# Patient Record
(demographics unavailable — no encounter records)

---

## 2025-01-31 NOTE — PHYSICAL EXAM
[de-identified] : General: Well-developed and well-nourished.  No acute distress. Psychiatric: Behavior was cooperative   Head:  Normocephalic and atraumatic Eyes:  Sclera white. Conjunctiva and eyelids pink and moist without discharge. Cardiovascular:  Regular Respiratory:  Trachea midline. Normal effort. No accessory muscle use with respiration Abdomen: Non distended, soft and nontender Skin:  No rashes, ulcers, or lesions appreciated. Neck: There is no pain with extension, Extremities: No edema  Musculoskeletal: Moving all extremities freely  Neuro: CN 2-12 Grossly intact Sensation is mildly impaired to light touch C6 dermatome left upper extremity Bicep strength 4+/5 bilaterally Gait: Ambulates with no assistive device.

## 2025-01-31 NOTE — HISTORY OF PRESENT ILLNESS
[FreeTextEntry1] : Interval Note:   Ongoing back  Medication - Tizanidine has been allowing patient to go about activities of daily living with less pain.  Moving bowels regularly. No recent falls.   Patient denies any bowel/bladder incontinence, no saddle/perineal anesthesia or any other red flag signs or symptoms.   ---  Right C6/7  RFA on  09/05/2024 - Improved pain from 9/10 to 1 /10 (80% relief).  reports significant improvement in pain  Right C6/7  diagnostic medial branch block on  07/18/2024 - Improved pain from 9/10 to 1 /10 (80% relief).  Reports significant improvement in pain and range of motion for six hours following the procedure.   Right C6/7  diagnostic medial branch block on 5/30/2024 - Improved pain from 9/10 to 1 /10 (80% relief).  Reports significant improvement in pain and range of motion for several hours following the procedure. The pain has since returned. Reports that they were was able to go about ADL's, clean hours, groom themselves with minimal pain at the time.    ---  HPI - Mr. Shahid Tinoco, a 28-year-old male, presents today with a 2-year history of persistent back pain, neck pain, and hand numbness. Referred by Dr Cevallos. He describes his pain as constant, with a current and average intensity of 6/10, occasionally peaking at 10/10. The pain is characterized by a diverse array of sensations, including sharp, dull, aching, burning, shooting, and electric feelings. His discomfort is exacerbated by various physical activities such as laying, sitting, standing, walking, transitioning positions, bending, lifting, and movements involving his head and neck. Conversely, his pain is somewhat alleviated by medications, heat, and rest. Despite engaging in physical therapy and chiropractic treatments, which provided minimal relief, his symptoms have persisted and progressed, particularly impacting his daily living activities and sleep quality. Notably, Mr. Tinoco denies experiencing any numbness or weakness currently, alongside any red-flag symptoms such as bowel/bladder incontinence or saddle anesthesia, maintaining regular bowel movements. He has not previously sought pain management specialist care but reports adherence to a provider-directed stretching regimen over the last 6 weeks, indicating a commitment to non-pharmacological interventions for symptom management. Seen by Neurology - EMG - WNL.

## 2025-01-31 NOTE — ASSESSMENT
[FreeTextEntry1] : Mr. 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   Tizanidine 2 mg po bid prn spasms  >> Interventions   Not amenable to TPI at this time   >> Therapy and Other Modalities   Continue with daily home stretching regimen  >> Imaging and Other Studies  I have personally reviewed the images in detail together with the patient today, and I have answered all questions regarding this condition to the best of my ability, to the patient's satisfaction.  >> Consults  Neurosurgery referral  Sports Medicine [facility]

## 2025-01-31 NOTE — DATA REVIEWED
[FreeTextEntry1] : PROCEDURE: MRI CERVICAL SPINE  ORDER #: [id number] CC: ; ; ; End of cc's  MRI of the cervical spine  History: Neck pain  Technique: Multiplanar, multi sequential images of the cervical spine were obtained without contrast using a standard protocol.  Prior study: Radiographs dated 3/29/2024  Findings:  Bone: No acute fracture. Partial osseous fusion of the C5-C6 disc space with fusion of the facets at this level. No acute fracture.  Alignment: No significant spondylolisthesis.  Disc spaces: Partial fusion of the C5-C6 disc space, as above. Disc spaces are otherwise unremarkable.  Spinal cord: No cord signal abnormality.  Paraspinal/Prevertebral soft tissues: Indeterminate nodular T2 hyperintensity within the left upper lung, measuring 6 mm.  Evaluation of the individual motion segments demonstrates the following:  C2/3 level: No canal or neuroforaminal stenosis.  C3/4 level: No canal or neuroforaminal stenosis.  C4/5 level: Mild bilateral uncovertebral joint hypertrophy. Mild bilateral neural foraminal narrowing, right greater than left. No central canal stenosis.  C5/6 level: Partial fusion of the disc space. Fusion of the facet joints bilaterally. No canal or neuroforaminal stenosis.  C6/7 level: Mild left uncovertebral joint hypertrophy. Minimal left and no right neural foraminal narrowing. No central canal stenosis.  C7/T1 level: No canal or neuroforaminal stenosis.  Impression:  Partial osseous fusion of the C5-C6 disc space with fusion of the facet joints bilaterally at this level.  Mild degenerative changes at C4-C5 and C6-C7, as detailed above.  Indeterminate nodular T2 hyperintensity within the left upper lung, measuring 6 mm, which is not well evaluated on this MRI. Recommend further evaluation with low dose chest CT.  COMMUNICATION: Findings were sent via [employer] email to Dr. Tinoco at the time of this dictation.  --- End of Report ---  Electronically Signed: ____________________________________________ Tray Webb MD 05/08/24 1006

## 2025-02-04 NOTE — ASSESSMENT
[FreeTextEntry1] : Chronic neck pain with history of Klippel-Feil syndrome, autofusion of C5-C6, cervical radiculopathy which has largely failed pain management, physical therapy, medication therapy.  Most recent MRI from May 2024 reviewed which she has had worsening symptoms since.  Reported previous nerve conduction studies completed 2 years ago were normal prior to worsening symptoms. - Will obtain updated MRI cervical spine as symptoms have significantly worsened with right arm weakness since his previous - Order placed for updated NCS/EMG studies - Naproxen twice daily as needed.  Discussed caution with NSAIDs - Tizanidine as already prescribed as needed - Continue home exercises that he learned from physical therapy - Will plan to follow-up after imaging and nerve studies.  Would likely benefit from surgical evaluation given failure to conservative care

## 2025-02-04 NOTE — HISTORY OF PRESENT ILLNESS
[de-identified] : 02/04/2025: Patient is a 29-year-old male presenting for evaluation of neck pain and right-sided shoulder pain.  He has had persistent neck pain for approximately 3 years now without injury.  He notes pain at rest that worsens with any movement or physical activity.  Prior to pain, he states he was very active with exercise which she is no longer able to do.  He feels pain travel from the posterior aspect of the neck into both shoulders.  He notices weakness and heaviness of his arms.  He has tried NSAIDs, several rounds (4) of physical therapy without relief, medial branch block/RFA at C6-7 without long-term benefit.  He had reported EMG completed 2 years ago which was normal.  He has been seen by neurosurgery as well that recommended continued care with pain management.  He has become very frustrated with his symptoms as they interfering with even the basic activities of daily life

## 2025-02-04 NOTE — REVIEW OF SYSTEMS
[Arthralgia] : arthralgia [Joint Pain] : joint pain [Joint Stiffness] : joint stiffness [Negative] : Integumentary [Joint Swelling] : no joint swelling

## 2025-02-04 NOTE — PHYSICAL EXAM
[de-identified] : Constitutional: Well developed, well nourished, able to communicate Neuro: Normal sensation, No focal deficits Skin: Intact CV: Peripheral vascular exam grossly normal Pulm: No signs of respiratory distress Psych: Oriented, normal mood and affect  Neck: - No obvious deformity, swelling, or bruising - Significant pain to light touch right greater than left trap and cervical paraspinals - No pain with palpation of spinous processes - ROM limited throughout flexion, extension, side bending, and rotation with pain - 4/5 strength right biceps, 4+/5 strength left biceps.  Remaining upper extremities 5/5 bilaterally -Pain with Spurling Maneuver -Able to bring both shoulders into full forward flexion to 180 degrees and able to reach behind the back internally rotating to approximately lumbar spine all with pain - Distally neurovascularly intact
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
## 2025-02-07 NOTE — REVIEW OF SYSTEMS
[TextEntry] :  Head: No Alopecia, no scalp pain, no temporal headaches, no hearing loss, no red/painful eyes, no dry eyes ,no dry mouth, no painless oral ulcers,no poor dentition,no jaw claudication ENT: No enlarged lymph nodes, no parotid swelling,no dysphagia CVS: No Positional chest pain Pulm: No SOB, no cough, no hemoptysis, no pleuritic chest pain Abdomen:  No constipation,no diarrhea, no postprandial pain Skin: No rash,no dry skin,no tight skin,no nodules, no Raynaud's :  no blood in urine Heme: No clots,no hx of low WBC,no hx of low Hb,no hx of low platelets Neuro: No tingling,no numbness,no foot drop, no weakness, no seizures, no temporal headaches Systemic: No fevers, no weight loss, no night sweats No H/o radiation therapy, no recent hospitalization

## 2025-02-07 NOTE — PHYSICAL EXAM
[TextEntry] : General: alert, well appearing, no distress HEENT: no hair thinning or alopecia, clear conjunctiva, no oral or nasal ulcers, no cervical lymphadenopathy Cardiac: S1+, S2+,normal rate and rhythm, no murmur, rubs or gallops Pulm: normal respiratory effort, clear to auscultation bilaterally GI: abdomen soft, non-tender and non-distended   MSK: Hand-DIP joints, PIP joints, MCP joints, CMC joints without evidence of synovitis or effusion. Intact and nonpainful range of motion. No Heberden/Jim nodes. Wrist, elbow, and shoulder joints without evidence of synovitis or effusion. Intact and nonpainful range of motion. Foot/ankle/knee/hip joints without erythema/effusion/tenderness to palpation and with intact nonpainful range of motion. DEBBY negative bilaterally Tenderness over right Achilles, no swelling Spine: Limited range of motion in all planes secondary to pain. bilateral SI joint tenderness but has paraspinal tenderness throughout   Skin: No rashes, warm and well-perfused. No nail pitting, digital ulceration, dactylitis, or telangiectasias. No subcutaneous nodules.

## 2025-03-14 NOTE — PHYSICAL EXAM
[de-identified] : General: Well-developed and well-nourished.  No acute distress. Psychiatric: Behavior was cooperative   Head:  Normocephalic and atraumatic Eyes:  Sclera white. Conjunctiva and eyelids pink and moist without discharge. Cardiovascular:  Regular Respiratory:  Trachea midline. Normal effort. No accessory muscle use with respiration Abdomen: Non distended, soft and nontender Skin:  No rashes, ulcers, or lesions appreciated. Neck: There is no pain with extension, Extremities: No edema  Musculoskeletal: Moving all extremities freely  Neuro: CN 2-12 Grossly intact Sensation is mildly impaired to light touch C6 dermatome left upper extremity Bicep strength 4+/5 bilaterally Gait: Ambulates with no assistive device.

## 2025-03-14 NOTE — HISTORY OF PRESENT ILLNESS
[FreeTextEntry1] : Interval Note:  Ongoing neck pain - has EMG set for 3/20  Has updated MRI pending  Medication - Tizanidine has been allowing patient to go about activities of daily living with less pain.  Moving bowels regularly. No recent falls.   Patient denies any bowel/bladder incontinence, no saddle/perineal anesthesia or any other red flag signs or symptoms.   ---  Right C6/7  RFA on  09/05/2024 - Improved pain from 9/10 to 1 /10 (80% relief).  reports significant improvement in pain  Right C6/7  diagnostic medial branch block on  07/18/2024 - Improved pain from 9/10 to 1 /10 (80% relief).  Reports significant improvement in pain and range of motion for six hours following the procedure.   Right C6/7  diagnostic medial branch block on 5/30/2024 - Improved pain from 9/10 to 1 /10 (80% relief).  Reports significant improvement in pain and range of motion for several hours following the procedure. The pain has since returned. Reports that they were was able to go about ADL's, clean hours, groom themselves with minimal pain at the time.    ---  HPI - Mr. Shahid Tinoco, a 28-year-old male, presents today with a 2-year history of persistent back pain, neck pain, and hand numbness. Referred by Dr Cevallos. He describes his pain as constant, with a current and average intensity of 6/10, occasionally peaking at 10/10. The pain is characterized by a diverse array of sensations, including sharp, dull, aching, burning, shooting, and electric feelings. His discomfort is exacerbated by various physical activities such as laying, sitting, standing, walking, transitioning positions, bending, lifting, and movements involving his head and neck. Conversely, his pain is somewhat alleviated by medications, heat, and rest. Despite engaging in physical therapy and chiropractic treatments, which provided minimal relief, his symptoms have persisted and progressed, particularly impacting his daily living activities and sleep quality. Notably, Mr. Tinoco denies experiencing any numbness or weakness currently, alongside any red-flag symptoms such as bowel/bladder incontinence or saddle anesthesia, maintaining regular bowel movements. He has not previously sought pain management specialist care but reports adherence to a provider-directed stretching regimen over the last 6 weeks, indicating a commitment to non-pharmacological interventions for symptom management. Seen by Neurology - EMG - WNL.

## 2025-03-14 NOTE — DATA REVIEWED
[FreeTextEntry1] : Exam: XC C SPINE 2 OR 3 VIEWS Order#: [id number]    CLINICAL HISTORY: Rule out Ankylosing spondylitis  Thoracic Spine  Frontal and lateral projections of thoracic spine demonstrate satisfactory alignment of the bony structures. There is a very mild S-shaped thoracolumbar scoliosis. There is mild thoracic kyphosis. No bony ankylosis is noted. No fractures or vertebral compressions can be appreciated. There is no evidence of pedicle destruction.   IMPRESSION: No evidence of bony ankylosis.    Lumbosacral Spine.  Frontal, lateral and cone-down projections demonstrate satisfactory alignment of the bony structures. There is a mild thoracolumbar scoliosis centered at approximately the T12-L1 level convexity to the patient's left. No fracture or vertebral compression can be appreciated. There is no evidence of pedicle destruction. Intervertebral disc spaces are satisfactorily maintained. The sacroiliac joints are symmetric.  IMPRESSION: Mild thoracolumbar scoliosis. No evidence of bony ankylosis.    Cervical Spine.  Frontal, lateral, right and left oblique and open mouth projections demonstrate satisfactory alignment of the bony structures. There is fusion of the C5 and C6 vertebral bodies. No fracture or vertebral compression can be appreciated. No prevertebral soft tissue swelling or abnormal splaying of the spinous processes is noted. The intervertebral disc spaces and the exiting neural foramen are well maintained. No destructive bony lesion is noted.  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    Sacroiliac joints  Frontal and tangential projections demonstrate the sacroiliac joints to be symmetric and well maintained. No erosion or destructive lesion is noted. No bony sclerosis can be appreciated.  IMPRESSION: Unremarkable plain film examination of the sacroiliac joints.  --- End of Report ---    ***Electronically Signed *** ----------------------------------------------- Cornelius Tejeda MD 02/07/25 1540  Dictated on 02/07/25   Report cc: Rebecca James   Exam: XC THORACIC SPINE 2 VWS (C) Order#: [id number]    CLINICAL HISTORY: Rule out Ankylosing spondylitis  Thoracic Spine  Frontal and lateral projections of thoracic spine demonstrate satisfactory alignment of the bony structures. There is a very mild S-shaped thoracolumbar scoliosis. There is mild thoracic kyphosis. No bony ankylosis is noted. No fractures or vertebral compressions can be appreciated. There is no evidence of pedicle destruction.   IMPRESSION: No evidence of bony ankylosis.    Lumbosacral Spine.  Frontal, lateral and cone-down projections demonstrate satisfactory alignment of the bony structures. There is a mild thoracolumbar scoliosis centered at approximately the T12-L1 level convexity to the patient's left. No fracture or vertebral compression can be appreciated. There is no evidence of pedicle destruction. Intervertebral disc spaces are satisfactorily maintained. The sacroiliac joints are symmetric.  IMPRESSION: Mild thoracolumbar scoliosis. No evidence of bony ankylosis.    Cervical Spine.  Frontal, lateral, right and left oblique and open mouth projections demonstrate satisfactory alignment of the bony structures. There is fusion of the C5 and C6 vertebral bodies. No fracture or vertebral compression can be appreciated. No prevertebral soft tissue swelling or abnormal splaying of the spinous processes is noted. The intervertebral disc spaces and the exiting neural foramen are well maintained. No destructive bony lesion is noted.  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    Sacroiliac joints  Frontal and tangential projections demonstrate the sacroiliac joints to be symmetric and well maintained. No erosion or destructive lesion is noted. No bony sclerosis can be appreciated.  IMPRESSION: Unremarkable plain film examination of the sacroiliac joints.  --- End of Report ---    ***Electronically Signed *** ----------------------------------------------- Cornelius Tejeda MD 02/07/25 1540  Dictated on 02/07/25   Report cc: Rebecca James MD; Notes  Exam: XC L S SPINE 2 OR 3 VIEWS Order#: [id number]    CLINICAL HISTORY: Rule out Ankylosing spondylitis  Thoracic Spine  Frontal and lateral projections of thoracic spine demonstrate satisfactory alignment of the bony structures. There is a very mild S-shaped thoracolumbar scoliosis. There is mild thoracic kyphosis. No bony ankylosis is noted. No fractures or vertebral compressions can be appreciated. There is no evidence of pedicle destruction.   IMPRESSION: No evidence of bony ankylosis.    Lumbosacral Spine.  Frontal, lateral and cone-down projections demonstrate satisfactory alignment of the bony structures. There is a mild thoracolumbar scoliosis centered at approximately the T12-L1 level convexity to the patient's left. No fracture or vertebral compression can be appreciated. There is no evidence of pedicle destruction. Intervertebral disc spaces are satisfactorily maintained. The sacroiliac joints are symmetric.  IMPRESSION: Mild thoracolumbar scoliosis. No evidence of bony ankylosis.    Cervical Spine.  Frontal, lateral, right and left oblique and open mouth projections demonstrate satisfactory alignment of the bony structures. There is fusion of the C5 and C6 vertebral bodies. No fracture or vertebral compression can be appreciated. No prevertebral soft tissue swelling or abnormal splaying of the spinous processes is noted. The intervertebral disc spaces and the exiting neural foramen are well maintained. No destructive bony lesion is noted.  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    Sacroiliac joints  Frontal and tangential projections demonstrate the sacroiliac joints to be symmetric and well maintained. No erosion or destructive lesion is noted. No bony sclerosis can be appreciated.  IMPRESSION: Unremarkable plain film examination of the sacroiliac joints.  --- End of Report ---    ***Electronically Signed *** ----------------------------------------------- Cornelius Tejeda MD 02/07/25 1540  Dictated on 02/07/25   Report cc: Rebecca James MD;  Exam: XC SACROILIAC JOINTS Order#: [id number]    CLINICAL HISTORY: Rule out Ankylosing spondylitis  Thoracic Spine  Frontal and lateral projections of thoracic spine demonstrate satisfactory alignment of the bony structures. There is a very mild S-shaped thoracolumbar scoliosis. There is mild thoracic kyphosis. No bony ankylosis is noted. No fractures or vertebral compressions can be appreciated. There is no evidence of pedicle destruction.   IMPRESSION: No evidence of bony ankylosis.    Lumbosacral Spine.  Frontal, lateral and cone-down projections demonstrate satisfactory alignment of the bony structures. There is a mild thoracolumbar scoliosis centered at approximately the T12-L1 level convexity to the patient's left. No fracture or vertebral compression can be appreciated. There is no evidence of pedicle destruction. Intervertebral disc spaces are satisfactorily maintained. The sacroiliac joints are symmetric.  IMPRESSION: Mild thoracolumbar scoliosis. No evidence of bony ankylosis.    Cervical Spine.  Frontal, lateral, right and left oblique and open mouth projections demonstrate satisfactory alignment of the bony structures. There is fusion of the C5 and C6 vertebral bodies. No fracture or vertebral compression can be appreciated. No prevertebral soft tissue swelling or abnormal splaying of the spinous processes is noted. The intervertebral disc spaces and the exiting neural foramen are well maintained. No destructive bony lesion is noted.  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    Sacroiliac joints  Frontal and tangential projections demonstrate the sacroiliac joints to be symmetric and well maintained. No erosion or destructive lesion is noted. No bony sclerosis can be appreciated.  IMPRESSION: Unremarkable plain film examination of the sacroiliac joints.  --- End of Report ---    ***Electronically Signed *** ----------------------------------------------- Cornelius Tejeda MD 02/07/25 1540  Dictated on 02/07/25   Report cc: Rebecca James MD; PROCEDURE: MRI CERVICAL SPINE  ORDER #: [id number] CC: ; ; ; End of cc's  MRI of the cervical spine  History: Neck pain  Technique: Multiplanar, multi sequential images of the cervical spine were obtained without contrast using a standard protoco  l.  Prior study: Radiographs dated 3/29/2024  Findings:  Bone: No acute fracture. Partial osseous fusion of the C5-C6 disc space with fusion of the facets at this level. No acute fracture.  Alignment: No significant spondylolisthesis.  Disc spaces: Partial fusion of the C5-C6 disc space, as above. Disc spaces are otherwise unremarkable.  Spinal cord: No cord signal abnormality.  Paraspinal/Prevertebral soft tissues: Indeterminate nodular T2 hyperintensity within the left upper lung, measuring 6 mm.  Evaluation of the individual motion segments demonstrates the following:  C2/3 level: No canal or neuroforaminal stenosis.  C3/4 level: No canal or neuroforaminal stenosis.  C4/5 level: Mild bilateral uncovertebral joint hypertrophy. Mild bilateral neural foraminal narrowing, right greater than left. No central canal stenosis.  C5/6 level: Partial fusion of the disc space. Fusion of the facet joints bilaterally. No canal or neuroforaminal stenosis.  C6/7 level: Mild left uncovertebral joint hypertrophy. Minimal left and no right neural foraminal narrowing. No central canal stenosis.  C7/T1 level: No canal or neuroforaminal stenosis.  Impression:  Partial osseous fusion of the C5-C6 disc space with fusion of the facet joints bilaterally at this level.  Mild degenerative changes at C4-C5 and C6-C7, as detailed above.  Indeterminate nodular T2 hyperintensity within the left upper lung, measuring 6 mm, which is not well evaluated on this MRI. Recommend further evaluation with low dose chest CT.  COMMUNICATION: Findings were sent via [employer] email to Dr. Tinoco at the time of this dictation.  --- End of Report ---  Electronically Signed: ____________________________________________ Tray Webb MD 05/08/24 1008

## 2025-03-14 NOTE — ASSESSMENT
[FreeTextEntry1] : Mr. 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   Tizanidine 2 mg po bid prn spasms  Trial Gabapentin  Trial Meloxicam  Not amenable to Nortriptyline at this time  >> Interventions   Patient is an appropriate candidate for Right C6/7  radiofrequency ablation . Procedure discussed with patient including success rate, side effects and complications.   >> Therapy and Other Modalities   Continue with daily home stretching regimen  >> Imaging and Other Studies  I have personally reviewed the images in detail together with the patient today, and I have answered all questions regarding this condition to the best of my ability, to the patient's satisfaction.  >> Consults  Neurosurgery referral  Dr Mat Hall - Pain/ Psychiatry [facility]

## 2025-03-27 NOTE — REVIEW OF SYSTEMS
[Negative] : Psychiatric [FreeTextEntry9] : exam limited by cervical pain [de-identified] : pt denies sx on psych ROS

## 2025-03-27 NOTE — REASON FOR VISIT
[[facility] Provider/Facility] : [facility] Provider[facility]ility [Patient] : Patient [Prior Medical Records] : Prior Medical Records [FreeTextEntry1] : establish care

## 2025-03-27 NOTE — PHYSICAL EXAM
[Cooperative] : cooperative [Euthymic] : euthymic [Constricted] : constricted [Clear] : clear [Soft] : soft [Linear/Goal Directed] : linear/goal directed [Average] : average [WNL] : within normal limits

## 2025-03-27 NOTE — PLAN
[FreeTextEntry4] : -No acute psychiatric intervention -C/w pain mgmt per Dr. Tinoco - will coordinate -Virtual F/U TBD

## 2025-03-27 NOTE — PSYCHOSOCIAL ASSESSMENT
[All substances negative except as specified below] : All substances negative except as specified below [FreeTextEntry1] : active smoker, 1-2pp/wk, active cannabis use ~1 bowl per night  hx arrest for driving while smoking cannabis

## 2025-03-27 NOTE — HISTORY OF PRESENT ILLNESS
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  Pt seen in person. Denies prior psychiatric hx including prior diagnoses, inpatient or outpatient treatment including medication, and hx overt mood sx/psychosis/ESTEBAN/I/P. Pt does report recent coping difficulties related to worsening pain and its subsequent impact on his life and functionality. Pt states prior to emergence of pain ~3 years ago, he was in his usual state of health: he was working fulltime, he was an athlete playing multiple sports and going to the gym 3-5x/wk, and he had a busy social life. After his diagnosis, he thought he would be able to find a solution for the pain. However, pt has undergone numerous evaluations/tests as well as physical therapy, but continues to experience pain which has worsened over the last few months. Pt states he works much less now, is less able to support his mom financially, and is more limited in terms of physical activity including leaving the house. Reports feeling like he is damaging himself and/or making his condition worse if he continues to work. States he feels he does not have a real answer for why his pain persists, and that he has been 'told' that he 'should not be having the pain [he] does'. Pt admits that this has impacted his mood somewhat, but he is trying to adjust, including by pivoting to a new career and following his doctors' recommendations. Pt was recently prescribed gabapentin 100mg po nightly which he says helps w/ sleep. He reports daily cannabis use which helps w/ pain/sleep. Pt is an active smoker, reports fluctuating use ~1-2pp week. [FreeTextEntry2] : per hpi [FreeTextEntry3] : none

## 2025-03-27 NOTE — RISK ASSESSMENT
[Clinical Interview] : Clinical Interview [No] : No [Alcohol/Substance Use disorders] : alcohol/substance use disorders [Triggering events leading to humiliation, shame, and/or despair] : triggering events leading to humiliation, shame, and/or despair (e.g. loss of relationship, financial or health status) (real or anticipated) [Chronic pain/other acute medical condition] : chronic pain or other acute medical condition [Perceived burden on family or others] : perceived burden on family or others [Identifies reasons for living] : identifies reasons for living [Supportive social network of family or friends] : supportive social network of family or friends [Responsibility to children, family, or others] : responsibility to children, family, or others [Engaged in work or school] : engaged in work or school [None in the patient's lifetime] : None in the patient's lifetime [None Known] : none known [Substance abuse] : substance abuse [Residential stability] : residential stability [Relationship stability] : relationship stability [Employment stability] : employment stability

## 2025-03-27 NOTE — REASON FOR VISIT
[[facility] Provider/Facility] : [facility] Provider/Facility [Patient] : Patient [Prior Medical Records] : Prior Medical Records [FreeTextEntry1] : establish care

## 2025-03-27 NOTE — DISCUSSION/SUMMARY
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  Pt denies overt mood sx/psychosis/ESTEBAN/I/P but does report recent coping difficulties related to worsening pain over the last few months and its subsequent impact on his life and functionality. States he works much less now and is more limited in terms of physical activity including leaving the house. Reports feeling like he is damaging himself and/or making his condition worse if he continues to work. States he feels he does not have a real answer for why his pain persists, and that he has been 'told' that he 'should not be having the pain [he] does'. Suspect combination of central sensitization and adjustment reaction. Pt not interested in pharmacologic mgmt or therapy at this time. Will coordinate w/ pt's pain specialist and continue longitudinal evaluation.

## 2025-03-27 NOTE — SOCIAL HISTORY
[FreeTextEntry1] : HS grad. 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 Single, childless/non caregiver. Domiciled w/ mom. Limited relationship w/ dad. 1 older sister no longer living at home.

## 2025-04-25 NOTE — DATA REVIEWED
[FreeTextEntry1] : Exam: XC C SPINE 2 OR 3 VIEWS Order#: [id number]    CLINICAL HISTORY: Rule out Ankylosing spondylitis  Thoracic Spine  Frontal and lateral projections of thoracic spine demonstrate satisfactory alignment of the bony structures. There is a very mild S-shaped thoracolumbar scoliosis. There is mild thoracic kyphosis. No bony ankylosis is noted. No fractures or vertebral compressions can be appreciated. There is no evidence of pedicle destruction.   IMPRESSION: No evidence of bony ankylosis.    Lumbosacral Spine.  Frontal, lateral and cone-down projections demonstrate satisfactory alignment of the bony structures. There is a mild thoracolumbar scoliosis centered at approximately the T12-L1 level convexity to the patient's left. No fracture or vertebral compression can be appreciated. There is no evidence of pedicle destruction. Intervertebral disc spaces are satisfactorily maintained. The sacroiliac joints are symmetric.  IMPRESSION: Mild thoracolumbar scoliosis. No evidence of bony ankylosis.    Cervical Spine.  Frontal, lateral, right and left oblique and open mouth projections demonstrate satisfactory alignment of the bony structures. There is fusion of the C5 and C6 vertebral bodies. No fracture or vertebral compression can be appreciated. No prevertebral soft tissue swelling or abnormal splaying of the spinous processes is noted. The intervertebral disc spaces and the exiting neural foramen are well maintained. No destructive bony lesion is noted.  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    Sacroiliac joints  Frontal and tangential projections demonstrate the sacroiliac joints to be symmetric and well maintained. No erosion or destructive lesion is noted. No bony sclerosis can be appreciated.  IMPRESSION: Unremarkable plain film examination of the sacroiliac joints.  --- End of Report ---    ***Electronically Signed *** ----------------------------------------------- Cornelius Tejeda MD 02/07/25 1540  Dictated on 02/07/25   Report cc: Rebecca James   Exam: XC THORACIC SPINE 2 VWS (C) Order#: [id number]    CLINICAL HISTORY: Rule out Ankylosing spondylitis  Thoracic Spine  Frontal and lateral projections of thoracic spine demonstrate satisfactory alignment of the bony structures. There is a very mild S-shaped thoracolumbar scoliosis. There is mild thoracic kyphosis. No bony ankylosis is noted. No fractures or vertebral compressions can be appreciated. There is no evidence of pedicle destruction.   IMPRESSION: No evidence of bony ankylosis.    Lumbosacral Spine.  Frontal, lateral and cone-down projections demonstrate satisfactory alignment of the bony structures. There is a mild thoracolumbar scoliosis centered at approximately the T12-L1 level convexity to the patient's left. No fracture or vertebral compression can be appreciated. There is no evidence of pedicle destruction. Intervertebral disc spaces are satisfactorily maintained. The sacroiliac joints are symmetric.  IMPRESSION: Mild thoracolumbar scoliosis. No evidence of bony ankylosis.    Cervical Spine.  Frontal, lateral, right and left oblique and open mouth projections demonstrate satisfactory alignment of the bony structures. There is fusion of the C5 and C6 vertebral bodies. No fracture or vertebral compression can be appreciated. No prevertebral soft tissue swelling or abnormal splaying of the spinous processes is noted. The intervertebral disc spaces and the exiting neural foramen are well maintained. No destructive bony lesion is noted.  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    Sacroiliac joints  Frontal and tangential projections demonstrate the sacroiliac joints to be symmetric and well maintained. No erosion or destructive lesion is noted. No bony sclerosis can be appreciated.  IMPRESSION: Unremarkable plain film examination of the sacroiliac joints.  --- End of Report ---    ***Electronically Signed *** ----------------------------------------------- Cornelius Tejeda MD 02/07/25 1540  Dictated on 02/07/25   Report cc: Rebecca James MD; Notes  Exam: XC L S SPINE 2 OR 3 VIEWS Order#: [id number]    CLINICAL HISTORY: Rule out Ankylosing spondylitis  Thoracic Spine  Frontal and lateral projections of thoracic spine demonstrate satisfactory alignment of the bony structures. There is a very mild S-shaped thoracolumbar scoliosis. There is mild thoracic kyphosis. No bony ankylosis is noted. No fractures or vertebral compressions can be appreciated. There is no evidence of pedicle destruction.   IMPRESSION: No evidence of bony ankylosis.    Lumbosacral Spine.  Frontal, lateral and cone-down projections demonstrate satisfactory alignment of the bony structures. There is a mild thoracolumbar scoliosis centered at approximately the T12-L1 level convexity to the patient's left. No fracture or vertebral compression can be appreciated. There is no evidence of pedicle destruction. Intervertebral disc spaces are satisfactorily maintained. The sacroiliac joints are symmetric.  IMPRESSION: Mild thoracolumbar scoliosis. No evidence of bony ankylosis.    Cervical Spine.  Frontal, lateral, right and left oblique and open mouth projections demonstrate satisfactory alignment of the bony structures. There is fusion of the C5 and C6 vertebral bodies. No fracture or vertebral compression can be appreciated. No prevertebral soft tissue swelling or abnormal splaying of the spinous processes is noted. The intervertebral disc spaces and the exiting neural foramen are well maintained. No destructive bony lesion is noted.  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    Sacroiliac joints  Frontal and tangential projections demonstrate the sacroiliac joints to be symmetric and well maintained. No erosion or destructive lesion is noted. No bony sclerosis can be appreciated.  IMPRESSION: Unremarkable plain film examination of the sacroiliac joints.  --- End of Report ---    ***Electronically Signed *** ----------------------------------------------- Cornelius Tejeda MD 02/07/25 1540  Dictated on 02/07/25   Report cc: Rebecca James MD;  Exam: XC SACROILIAC JOINTS Order#: [id number]    CLINICAL HISTORY: Rule out Ankylosing spondylitis  Thoracic Spine  Frontal and lateral projections of thoracic spine demonstrate satisfactory alignment of the bony structures. There is a very mild S-shaped thoracolumbar scoliosis. There is mild thoracic kyphosis. No bony ankylosis is noted. No fractures or vertebral compressions can be appreciated. There is no evidence of pedicle destruction.   IMPRESSION: No evidence of bony ankylosis.    Lumbosacral Spine.  Frontal, lateral and cone-down projections demonstrate satisfactory alignment of the bony structures. There is a mild thoracolumbar scoliosis centered at approximately the T12-L1 level convexity to the patient's left. No fracture or vertebral compression can be appreciated. There is no evidence of pedicle destruction. Intervertebral disc spaces are satisfactorily maintained. The sacroiliac joints are symmetric.  IMPRESSION: Mild thoracolumbar scoliosis. No evidence of bony ankylosis.    Cervical Spine.  Frontal, lateral, right and left oblique and open mouth projections demonstrate satisfactory alignment of the bony structures. There is fusion of the C5 and C6 vertebral bodies. No fracture or vertebral compression can be appreciated. No prevertebral soft tissue swelling or abnormal splaying of the spinous processes is noted. The intervertebral disc spaces and the exiting neural foramen are well maintained. No destructive bony lesion is noted.  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    Sacroiliac joints  Frontal and tangential projections demonstrate the sacroiliac joints to be symmetric and well maintained. No erosion or destructive lesion is noted. No bony sclerosis can be appreciated.  IMPRESSION: Unremarkable plain film examination of the sacroiliac joints.  --- End of Report ---    ***Electronically Signed *** ----------------------------------------------- Cornelius Tejeda MD 02/07/25 1540  Dictated on 02/07/25   Report cc: Rebecca James MD; PROCEDURE: MRI CERVICAL SPINE  ORDER #: [id number] CC: ; ; ; End of cc's  MRI of the cervical spine  History: Neck pain  Technique: Multiplanar, multi sequential images of the cervical spine were obtained without contrast using a standard protoco  l.  Prior study: Radiographs dated 3/29/2024  Findings:  Bone: No acute fracture. Partial osseous fusion of the C5-C6 disc space with fusion of the facets at this level. No acute fracture.  Alignment: No significant spondylolisthesis.  Disc spaces: Partial fusion of the C5-C6 disc space, as above. Disc spaces are otherwise unremarkable.  Spinal cord: No cord signal abnormality.  Paraspinal/Prevertebral soft tissues: Indeterminate nodular T2 hyperintensity within the left upper lung, measuring 6 mm.  Evaluation of the individual motion segments demonstrates the following:  C2/3 level: No canal or neuroforaminal stenosis.  C3/4 level: No canal or neuroforaminal stenosis.  C4/5 level: Mild bilateral uncovertebral joint hypertrophy. Mild bilateral neural foraminal narrowing, right greater than left. No central canal stenosis.  C5/6 level: Partial fusion of the disc space. Fusion of the facet joints bilaterally. No canal or neuroforaminal stenosis.  C6/7 level: Mild left uncovertebral joint hypertrophy. Minimal left and no right neural foraminal narrowing. No central canal stenosis.  C7/T1 level: No canal or neuroforaminal stenosis.  Impression:  Partial osseous fusion of the C5-C6 disc space with fusion of the facet joints bilaterally at this level.  Mild degenerative changes at C4-C5 and C6-C7, as detailed above.  Indeterminate nodular T2 hyperintensity within the left upper lung, measuring 6 mm, which is not well evaluated on this MRI. Recommend further evaluation with low dose chest CT.  COMMUNICATION: Findings were sent via [employer] email to Dr. Tinoco at the time of this dictation.  --- End of Report ---  Electronically Signed: ____________________________________________ Tray Webb MD 05/08/24 1006

## 2025-04-25 NOTE — HISTORY OF PRESENT ILLNESS
[FreeTextEntry1] : Interval Note:  Ongoing neck pain - has EMG set for 3/20  Has updated MRI pending  Medication - Tizanidine has been allowing patient to go about activities of daily living with less pain./;  Moving bowels regularly. No recent falls.   Patient denies any bowel/bladder incontinence, no saddle/perineal anesthesia or any other red flag signs or symptoms.  ---  Right C6/7  RFA on  09/05/2024 - Improved pain from 9/10 to 1 /10 (80% relief).  reports significant improvement in pain  Right C6/7  diagnostic medial branch block on  07/18/2024 - Improved pain from 9/10 to 1 /10 (80% relief).  Reports significant improvement in pain and range of motion for six hours following the procedure.   Right C6/7  diagnostic medial branch block on 5/30/2024 - Improved pain from 9/10 to 1 /10 (80% relief).  Reports significant improvement in pain and range of motion for several hours following the procedure. The pain has since returned. Reports that they were was able to go about ADL's, clean hours, groom themselves with minimal pain at the time.    ---  HPI - Mr. Shahid Tinoco, a 28-year-old male, presents today with a 2-year history of persistent back pain, neck pain, and hand numbness. Referred by Dr Cevallos. He describes his pain as constant, with a current and average intensity of 6/10, occasionally peaking at 10/10. The pain is characterized by a diverse array of sensations, including sharp, dull, aching, burning, shooting, and electric feelings. His discomfort is exacerbated by various physical activities such as laying, sitting, standing, walking, transitioning positions, bending, lifting, and movements involving his head and neck. Conversely, his pain is somewhat alleviated by medications, heat, and rest. Despite engaging in physical therapy and chiropractic treatments, which provided minimal relief, his symptoms have persisted and progressed, particularly impacting his daily living activities and sleep quality. Notably, Mr. Tinoco denies experiencing any numbness or weakness currently, alongside any red-flag symptoms such as bowel/bladder incontinence or saddle anesthesia, maintaining regular bowel movements. He has not previously sought pain management specialist care but reports adherence to a provider-directed stretching regimen over the last 6 weeks, indicating a commitment to non-pharmacological interventions for symptom management. Seen by Neurology - EMG - WNL.

## 2025-04-25 NOTE — PHYSICAL EXAM
[de-identified] : General: Well-developed and well-nourished.  No acute distress. Psychiatric: Behavior was cooperative   Head:  Normocephalic and atraumatic Eyes:  Sclera white. Conjunctiva and eyelids pink and moist without discharge. Cardiovascular:  Regular Respiratory:  Trachea midline. Normal effort. No accessory muscle use with respiration Abdomen: Non distended, soft and nontender Skin:  No rashes, ulcers, or lesions appreciated. Neck: There is no pain with extension, Extremities: No edema  Musculoskeletal: Moving all extremities freely  Neuro: CN 2-12 Grossly intact Sensation is mildly impaired to light touch C6 dermatome left upper extremity Bicep strength 4+/5 bilaterally Gait: Ambulates with no assistive device.

## 2025-04-28 NOTE — ASSESSMENT
[FreeTextEntry1] : Mr. Tinoco has chronic neck pain.  And unfortunately I do not have a good solution for his problem.  I do not see anything surgical in nature here.  I suggest that he follow back up with the pain management doctors.  Perhaps Lyrica or something in addition to the gabapentin would be of value to him.  He is obviously disappointed but again unfortunately there is no surgical indication.

## 2025-04-28 NOTE — HISTORY OF PRESENT ILLNESS
[de-identified] : 29-year-old male referred by Dr. Gilbert for another opinion.  He has a history of chronic neck pain for many years.  He has a history of carpal Feil syndrome.  He is got an autofusion at C5-6.  Pain started initially on the right side but now it is essentially bilateral in nature.  Is on a daily basis.  It is interfering with his activities of daily living.  It is affecting his quality of life negatively.  He has seen a number of different physicians over the years.  He has had multiple procedures including radiofrequency ablation.  He reports having had an unsatisfactory reaction to steroids and so he has not had any epidural or transforaminal type injections.  Pain is focused more on the right side of the neck.  Does not radiate down to the hand.  Again he has pain on the left side but it is not quite as severe.

## 2025-04-28 NOTE — PHYSICAL EXAM
[de-identified] : Exam today he is good tenderness in the paracervical musculature on the right side little tender on the left side also his range of motion is about 75% of normal motor strength is preserved light touch sensations intact [de-identified] : I independently reviewed multiple imaging studies including MRI scan and CT scan and x-rays.  He has a partial autofusion at C5-6.  There are some mild degenerative changes at C4-5.  I do not see any significant nerve root compression.

## 2025-05-01 NOTE — PHYSICAL EXAM
[Cooperative] : cooperative [Euthymic] : euthymic [Full] : full [Clear] : clear [Soft] : soft [Linear/Goal Directed] : linear/goal directed [Average] : average [WNL] : within normal limits

## 2025-05-01 NOTE — REASON FOR VISIT
[Patient preference] : as per patient preference [Telehealth (audio & video) - Individual/Group] : This visit was provided via telehealth using real-time 2-way audio visual technology. [Medical Office: ([facility])___] : The provider was located at the medical office in [unfilled]. [Home] : The patient, [unfilled], was located at home, [unfilled], at the time of the visit. [FreeTextEntry4] : 3:00pm [FreeTextEntry5] : 3:30pm [FreeTextEntry1] : psychiatric evaluation

## 2025-05-01 NOTE — DISCUSSION/SUMMARY
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  Pt continues to deny overt mood sx/psychosis/ESTEBAN/I/P w/ continued worsening of pain. Discussed possibility of central sensitization and reviewed medication options to address this, including TCAs. Pt remains wary of pharmacologic mgmt but wishes to think about it. Will continue to coordinate w/ pain specialist and f/u for ongoing discussion and psychoeducation.

## 2025-05-01 NOTE — HISTORY OF PRESENT ILLNESS
[FreeTextEntry1] : PER INTAKE NOTE: Pt seen in person. Denies prior psychiatric hx including prior diagnoses, inpatient or outpatient treatment including medication, and hx overt mood sx/psychosis/ESTEBAN/I/P. Pt does report recent coping difficulties related to worsening pain and its subsequent impact on his life and functionality. Pt states prior to emergence of pain ~3 years ago, he was in his usual state of health: he was working fulltime, he was an athlete playing multiple sports and going to the gym 3-5x/wk, and he had a busy social life. After his diagnosis, he thought he would be able to find a solution for the pain. However, pt has undergone numerous evaluations/tests as well as physical therapy, but continues to experience pain which has worsened over the last few months. Pt states he works much less now, is less able to support his mom financially, and is more limited in terms of physical activity including leaving the house. Reports feeling like he is damaging himself and/or making his condition worse if he continues to work. States he feels he does not have a real answer for why his pain persists, and that he has been 'told' that he 'should not be having the pain [he] does'. Pt admits that this has impacted his mood somewhat, but he is trying to adjust, including by pivoting to a new career and following his doctors' recommendations. Pt was recently prescribed gabapentin 100mg po nightly which he says helps w/ sleep. He reports daily cannabis use which helps w/ pain/sleep. Pt is an active smoker, reports fluctuating use ~1-2pp week. [FreeTextEntry2] : per hpi [FreeTextEntry3] : none

## 2025-05-01 NOTE — PLAN
[FreeTextEntry4] : -No acute psychiatric intervention -C/w pain mgmt per Dr. Tinoco - will coordinate -RTC in 3-4wks

## 2025-05-01 NOTE — REASON FOR VISIT
[Patient preference] : as per patient preference [Telehealth (audio & video) - Individual/Group] : This visit was provided via telehealth using real-time 2-way audio visual technology. [Medical Office: ([facility])___] : The provider was located at the medical office in [unfilled]. [Home] : The patient, [unfilled], was located at home, [unfilled], at the time of the visit. [FreeTextEntry4] : 3:00pm [FreeTextEntry5] : 3:30pm [FreeTextEntry1] : psychiatric evaluation